# Patient Record
Sex: MALE | Race: BLACK OR AFRICAN AMERICAN | URBAN - METROPOLITAN AREA
[De-identification: names, ages, dates, MRNs, and addresses within clinical notes are randomized per-mention and may not be internally consistent; named-entity substitution may affect disease eponyms.]

---

## 2018-02-09 ENCOUNTER — HOSPITAL ENCOUNTER (OUTPATIENT)
Dept: MEDSURG UNIT | Facility: HOSPITAL | Age: 83
End: 2018-02-11
Attending: INTERNAL MEDICINE | Admitting: INTERNAL MEDICINE

## 2018-02-09 LAB
ABS NEUT (OLG): 4.13 X10(3)/MCL (ref 2.1–9.2)
ALBUMIN SERPL-MCNC: 3.7 GM/DL (ref 3.4–5)
ALBUMIN/GLOB SERPL: 1.1 RATIO (ref 1.1–2)
ALP SERPL-CCNC: 48 UNIT/L (ref 50–136)
ALT SERPL-CCNC: 23 UNIT/L (ref 12–78)
APPEARANCE, UA: CLEAR
AST SERPL-CCNC: 20 UNIT/L (ref 15–37)
BACTERIA SPEC CULT: ABNORMAL /HPF
BASOPHILS # BLD AUTO: 0 X10(3)/MCL (ref 0–0.2)
BASOPHILS NFR BLD AUTO: 0 %
BILIRUB SERPL-MCNC: 0.5 MG/DL (ref 0.2–1)
BILIRUB UR QL STRIP: NEGATIVE
BILIRUBIN DIRECT+TOT PNL SERPL-MCNC: 0.1 MG/DL (ref 0–0.5)
BILIRUBIN DIRECT+TOT PNL SERPL-MCNC: 0.4 MG/DL (ref 0–0.8)
BNP BLD-MCNC: 91 PG/ML (ref 0–100)
BUN SERPL-MCNC: 109 MG/DL (ref 7–18)
CALCIUM SERPL-MCNC: 8.8 MG/DL (ref 8.5–10.1)
CHLORIDE SERPL-SCNC: 98 MMOL/L (ref 98–107)
CO2 SERPL-SCNC: 26 MMOL/L (ref 21–32)
COLOR UR: YELLOW
CREAT SERPL-MCNC: 2.92 MG/DL (ref 0.7–1.3)
EOSINOPHIL # BLD AUTO: 0.3 X10(3)/MCL (ref 0–0.9)
EOSINOPHIL NFR BLD AUTO: 4 %
ERYTHROCYTE [DISTWIDTH] IN BLOOD BY AUTOMATED COUNT: 12.7 % (ref 11.5–17)
GLOBULIN SER-MCNC: 3.5 GM/DL (ref 2.4–3.5)
GLUCOSE (UA): NEGATIVE
GLUCOSE SERPL-MCNC: 132 MG/DL (ref 74–106)
HCT VFR BLD AUTO: 31.7 % (ref 42–52)
HGB BLD-MCNC: 10.9 GM/DL (ref 14–18)
HGB UR QL STRIP: ABNORMAL
KETONES UR QL STRIP: NEGATIVE
LEUKOCYTE ESTERASE UR QL STRIP: ABNORMAL
LYMPHOCYTES # BLD AUTO: 1.5 X10(3)/MCL (ref 0.6–4.6)
LYMPHOCYTES NFR BLD AUTO: 22 %
MCH RBC QN AUTO: 32.9 PG (ref 27–31)
MCHC RBC AUTO-ENTMCNC: 34.4 GM/DL (ref 33–36)
MCV RBC AUTO: 95.8 FL (ref 80–94)
MONOCYTES # BLD AUTO: 0.9 X10(3)/MCL (ref 0.1–1.3)
MONOCYTES NFR BLD AUTO: 14 %
NEUTROPHILS # BLD AUTO: 4.13 X10(3)/MCL (ref 2.1–9.2)
NEUTROPHILS NFR BLD AUTO: 60 %
NITRITE UR QL STRIP: NEGATIVE
PH UR STRIP: 8.5 [PH] (ref 5–9)
PLATELET # BLD AUTO: 300 X10(3)/MCL (ref 130–400)
PMV BLD AUTO: 8.7 FL (ref 9.4–12.4)
POTASSIUM SERPL-SCNC: 3.9 MMOL/L (ref 3.5–5.1)
PROT SERPL-MCNC: 7.2 GM/DL (ref 6.4–8.2)
PROT UR QL STRIP: ABNORMAL
RBC # BLD AUTO: 3.31 X10(6)/MCL (ref 4.7–6.1)
RBC #/AREA URNS HPF: ABNORMAL /[HPF]
SODIUM SERPL-SCNC: 133 MMOL/L (ref 136–145)
SP GR UR STRIP: 1.01 (ref 1–1.03)
SQUAMOUS EPITHELIAL, UA: ABNORMAL
UROBILINOGEN UR STRIP-ACNC: 0.2
WBC # SPEC AUTO: 6.8 X10(3)/MCL (ref 4.5–11.5)
WBC #/AREA URNS HPF: 5 /HPF (ref 0–3)

## 2018-02-10 LAB
ABS NEUT (OLG): 3.75 X10(3)/MCL (ref 2.1–9.2)
ALBUMIN SERPL-MCNC: 2.9 GM/DL (ref 3.4–5)
ALBUMIN/GLOB SERPL: 0.9 {RATIO}
ALP SERPL-CCNC: 44 UNIT/L (ref 50–136)
ALT SERPL-CCNC: 19 UNIT/L (ref 12–78)
AST SERPL-CCNC: 13 UNIT/L (ref 15–37)
BASOPHILS # BLD AUTO: 0 X10(3)/MCL (ref 0–0.2)
BASOPHILS NFR BLD AUTO: 0 %
BILIRUB SERPL-MCNC: 0.3 MG/DL (ref 0.2–1)
BILIRUBIN DIRECT+TOT PNL SERPL-MCNC: 0.1 MG/DL (ref 0–0.2)
BILIRUBIN DIRECT+TOT PNL SERPL-MCNC: 0.2 MG/DL (ref 0–0.8)
BUN SERPL-MCNC: 102 MG/DL (ref 7–18)
CALCIUM SERPL-MCNC: 8.5 MG/DL (ref 8.5–10.1)
CHLORIDE SERPL-SCNC: 104 MMOL/L (ref 98–107)
CO2 SERPL-SCNC: 24 MMOL/L (ref 21–32)
CREAT SERPL-MCNC: 2.54 MG/DL (ref 0.7–1.3)
EOSINOPHIL # BLD AUTO: 0.3 X10(3)/MCL (ref 0–0.9)
EOSINOPHIL NFR BLD AUTO: 4 %
ERYTHROCYTE [DISTWIDTH] IN BLOOD BY AUTOMATED COUNT: 12.6 % (ref 11.5–17)
GLOBULIN SER-MCNC: 3.1 GM/DL (ref 2.4–3.5)
GLUCOSE SERPL-MCNC: 95 MG/DL (ref 74–106)
HCT VFR BLD AUTO: 29.1 % (ref 42–52)
HGB BLD-MCNC: 9.9 GM/DL (ref 14–18)
LYMPHOCYTES # BLD AUTO: 1.7 X10(3)/MCL (ref 0.6–4.6)
LYMPHOCYTES NFR BLD AUTO: 25 %
MCH RBC QN AUTO: 32.8 PG (ref 27–31)
MCHC RBC AUTO-ENTMCNC: 34 GM/DL (ref 33–36)
MCV RBC AUTO: 96.4 FL (ref 80–94)
MONOCYTES # BLD AUTO: 1 X10(3)/MCL (ref 0.1–1.3)
MONOCYTES NFR BLD AUTO: 15 %
NEUTROPHILS # BLD AUTO: 3.75 X10(3)/MCL (ref 2.1–9.2)
NEUTROPHILS NFR BLD AUTO: 55 %
PLATELET # BLD AUTO: 260 X10(3)/MCL (ref 130–400)
PMV BLD AUTO: 8.8 FL (ref 9.4–12.4)
POTASSIUM SERPL-SCNC: 3.8 MMOL/L (ref 3.5–5.1)
PROT SERPL-MCNC: 6 GM/DL (ref 6.4–8.2)
RBC # BLD AUTO: 3.02 X10(6)/MCL (ref 4.7–6.1)
SODIUM SERPL-SCNC: 138 MMOL/L (ref 136–145)
WBC # SPEC AUTO: 6.8 X10(3)/MCL (ref 4.5–11.5)

## 2018-02-11 LAB
BUN SERPL-MCNC: 77 MG/DL (ref 7–18)
CALCIUM SERPL-MCNC: 8.3 MG/DL (ref 8.5–10.1)
CHLORIDE SERPL-SCNC: 111 MMOL/L (ref 98–107)
CO2 SERPL-SCNC: 21 MMOL/L (ref 21–32)
CREAT SERPL-MCNC: 1.68 MG/DL (ref 0.7–1.3)
GLUCOSE SERPL-MCNC: 68 MG/DL (ref 74–106)
POTASSIUM SERPL-SCNC: 3.8 MMOL/L (ref 3.5–5.1)
SODIUM SERPL-SCNC: 143 MMOL/L (ref 136–145)

## 2022-04-30 NOTE — ED PROVIDER NOTES
"   Patient:   Rick Roche             MRN: [id number]            FIN: [id number]               Age:   89 years     Sex:  Male     :  1929   Associated Diagnoses:   Chronic kidney disease (CKD); Dehydration   Author:   Maureen OVIEDO, Pauline BRISENO      Basic Information   Time seen: Date & time 2018 12:15:00.   History source: Patient, family.   Arrival mode: Private vehicle, wheelchair.   History limitation: None.   Additional information: Chief Complaint from Nursing Triage Note : Chief Complaint   2018 12:14 CST       Chief Complaint           having kidney problems,sent by Dr Aguero after lab results showed decreased kidney function, has catheter    .      History of Present Illness   The patient presents with abnormal lab test and Patient and his daughter state that patient was told to report to the ED by Dr. Aguero because his "kidneys are functioning low" (IVELISSE madden). .  The onset was unknown.  Lab test value Lab test was performed by: primary care physician Patient was notified of lab results by: doctor's office.  Associated symptoms: denies chest pain, denies abdominal pain, denies nausea, denies vomiting, denies fever, denies chills, denies shortness of breath and denies headache.  Risk factors consist of diabetes mellitus and hypertension.  Prior episodes: none.  Therapy today: none.  Additional history: none.        Review of Systems   Constitutional symptoms:  Negative except as documented in HPI.   Skin symptoms:  Negative except as documented in HPI.   Eye symptoms:  Negative except as documented in HPI.   ENMT symptoms:  Negative except as documented in HPI.   Respiratory symptoms:  Negative except as documented in HPI.   Cardiovascular symptoms:  Negative except as documented in HPI.   Gastrointestinal symptoms:  Negative except as documented in HPI.   Genitourinary symptoms:  Negative except as documented in HPI.   Musculoskeletal symptoms:  Negative except as documented in HPI. "   Neurologic symptoms:  Negative except as documented in HPI.   Psychiatric symptoms:  Negative except as documented in HPI.   Allergy/immunologic symptoms:  Negative except as documented in HPI.             Additional review of systems information: All other systems reviewed and otherwise negative, All systems reviewed as documented in chart.      Health Status   Allergies:    Allergic Reactions (Selected)  High  Morphine- No reactions were documented.  Severity Not Documented  Codeine- No reactions were documented.  Iodine- No reactions were documented.  Lortab- No reactions were documented.  Shellfish- No reactions were documented.  Sulfa drugs- No reactions were documented.,    Allergies (6) Active Reaction  morphine None Documented  codeine None Documented  iodine None Documented  Lortab None Documented  shellfish None Documented  sulfa drugs None Documented  .   Medications:  (Selected)   Prescriptions  Prescribed  albuterol 2.5 mg/3 mL (0.083%) inhalation solution: 2.5 mg = 3 mL, INH, q6hr, PRN PRN for wheezing, # 60 EA, 5 Refill(s)  ferrous sulfate 325 mg (65 mg elemental iron) oral enteric coated tablet: 325 mg = 1 tab(s), NG, BID, # 60 tab(s), 4 Refill(s)  ranolazine 500 mg oral tablet, extended release: 500 mg = 1 tab(s), Oral, BID, # 60 tab(s), 4 Refill(s)  Documented Medications  Documented  Lantus Solostar Pen 100 units/mL subcutaneous solution: = 15 units, Subcutaneous, At Bedtime, # 1 box(es), 0 Refill(s)  Nexium 40 mg oral delayed release cap ([facility] Substitution): 40 mg, Oral, Daily, # 30, 0 Refill(s)  Plavix 75 mg oral tablet: 75 mg = 1 tab(s), Oral, Daily, 0 Refill(s)  Ranexa 1000 mg oral tablet, extended release: 0 Refill(s)  SUCRALFATE 1 GM TABLET: 1 gm = 1 tab(s), Oral, QID  Uloric 40 mg oral tablet: 40 mg = 1 tab(s), Oral, Daily, 0 Refill(s)  VIT D2 1.25 MG (50,000 UNIT): Oral  Zenpep 40,000 units-136,000 units-218,000 units oral delayed release capsule: 0 Refill(s)  amlodipine 5 mg oral  tablet: 5 mg = 1 tab(s), Oral, BID, 0 Refill(s)  aspirin 325 mg oral tablet: 325 mg = 1 tab(s), Oral, Daily, 0 Refill(s)  carvedilol 25 mg oral tablet: 25 mg = 1 tab(s), Oral, BID, 0 Refill(s)  citalopram 20 mg oral tablet: 20 mg = 1 tab(s), Oral, Daily, 0 Refill(s)  desloratadine 5 mg oral tablet: 0 Refill(s)  esomeprazole 20 mg oral delayed release capsule ([facility] Substitution): 0 Refill(s)  furosemide 40 mg oral tablet: 0 Refill(s)  hydrALAZINE 10 mg oral tablet: 0 Refill(s)  hydrOXYzine hydrochloride 25 mg oral tablet: 0 Refill(s)  nitroglycerin 0.4 mg sublingual TAB: 0.4 mg = 1 tab(s), SL, q5min, PRN PRN chest pain, 0 Refill(s)  pancrelipase 10,000 units-34,000 units-55,000 units oral delayed release capsule: 1 cap(s), Oral, TIDWM, 0 Refill(s)  simvastatin 20 mg oral tablet: 20 mg = 1 tab(s), Oral, Once a day (at bedtime), # 30 tab(s), 0 Refill(s)  sucralfate 1 g/10 mL oral suspension: 1 gm = 10 mL, Oral, Daily, 0 Refill(s)  timolol hemihydrate 0.5% ophthalmic solution: 1 drop(s), OPTH, qAM, 0 Refill(s).   Immunizations: Per nurse's notes.      Past Medical/ Family/ Social History   Medical history:    Resolved  Diabetes ([id number]):  Resolved.  HTN (hypertension) ([id number]):  Resolved.  CAD (coronary artery disease) ([id number]):  Resolved.  Hyperlipidemia (50132723):  Resolved.  Arthritis (1770175):  Resolved.  Bursitis of hip ([id number]):  Resolved.  GERD (gastroesophageal reflux disease) ([id number]):  Resolved.  Urinary frequency ([id number]):  Resolved., Reviewed as documented in chart.   Surgical history:    cardiac stents.  Comments:  8/15/2014 17:34 - Cece SMITH ,   x 6  cataract removal., Reviewed as documented in chart.   Family history:    No family history items have been selected or recorded., Reviewed as documented in chart.   Social history: Alcohol use: Denies, Tobacco use: Denies,  Drug use: Denies, Family/social situation: .      Physical Examination               Vital Signs   Vital Signs   2/9/2018 12:14 CST       Temperature Oral          36.4 DegC                             Temperature Oral (calculated)             97.52 DegF                             Peripheral Pulse Rate     62 bpm                             Respiratory Rate          20 br/min                             SpO2                      98 %                             Oxygen Therapy            Room air                             Systolic Blood Pressure   135 mmHg                             Diastolic Blood Pressure  61 mmHg  .   General:  Alert, no acute distress.    Skin:  Warm, dry, pink, intact.    Head:  Normocephalic, atraumatic.    Neck:  Supple, trachea midline, no tenderness.    Eye:  Pupils are equal, round and reactive to light, extraocular movements are intact, normal conjunctiva, vision unchanged.    Ears, nose, mouth and throat:  Oral mucosa moist.   Cardiovascular:  Regular rate and rhythm, No murmur, Normal peripheral perfusion, No edema.    Respiratory:  Lungs are clear to auscultation, respirations are non-labored, breath sounds are equal, Symmetrical chest wall expansion.    Back:  Normal range of motion.   Musculoskeletal:  Normal ROM.   Chest wall   Gastrointestinal:  Soft, Nontender, Non distended.    Genitourinary   Neurological:  Alert and oriented to person, place, time, and situation, No focal neurological deficit observed, CN II-XII intact, normal sensory observed, normal motor observed, normal speech observed, normal coordination observed.    Lymphatics:  No lymphadenopathy.   Psychiatric:  Cooperative, appropriate mood & affect, normal judgment.       Medical Decision Making   Differential Diagnosis:  Renal insufficiency, renal failure, lab error.    Differential Diagnosis:  Dehydration, electrolyte imbalance.    Documents reviewed:  Emergency department nurses' notes, emergency  department records, prior records.    Results review:  Lab results : Lab View   2/9/2018 14:11 CST       POC BNP iSTAT             91 pg/mL    2/9/2018 13:29 CST       UA Appear                 CLEAR                             UA Color                  YELLOW                             UA Spec Grav              1.007                             UA Bili                   Negative                             UA pH                     8.5                             UA Urobilinogen           0.2                             UA Blood                  Trace                             UA Glucose                Negative                             UA Ketones                Negative                             UA Protein                Trace                             UA Nitrite                Negative                             UA Leuk Est               3+                             UA WBC                    5 /HPF  HI                             UA RBC                    NONE SEEN                             UA Bacteria               NONE SEEN /HPF                             UA Squam Epithelial       NONE SEEN    2/9/2018 12:34 CST       Sodium Lvl                133 mmol/L  LOW                             Potassium Lvl             3.9 mmol/L                             Chloride                  98 mmol/L                             CO2                       26.0 mmol/L                             Calcium Lvl               8.8 mg/dL                             Glucose Lvl               132 mg/dL  HI                             BUN                       109.0 mg/dL  HI                             Creatinine                2.92 mg/dL  HI                             eGFR-AA                   26 mL/min/1.73 m2  NA                             eGFR-HUNTER                  22 mL/min/1.73 m2  NA                             Bili Total                0.5 mg/dL                             Bili Direct               0.10 mg/dL                              Bili Indirect             0.40 mg/dL                             AST                       20 unit/L                             ALT                       23 unit/L                             Alk Phos                  48 unit/L  LOW                             Total Protein             7.2 gm/dL                             Albumin Lvl               3.70 gm/dL                             Globulin                  3.50 gm/dL                             A/G Ratio                 1.1 ratio                             WBC                       6.8 x10(3)/mcL                             RBC                       3.31 x10(6)/mcL  LOW                             Hgb                       10.9 gm/dL  LOW                             Hct                       31.7 %  LOW                             Platelet                  300 x10(3)/mcL                             MCV                       95.8 fL  HI                             MCH                       32.9 pg  HI                             MCHC                      34.4 gm/dL                             RDW                       12.7 %                             MPV                       8.7 fL  LOW                             Abs Neut                  4.13 x10(3)/mcL                             Neutro Auto               60 %  NA                             Lymph Auto                22 %                             Mono Auto                 14 %  NA                             Eos Auto                  4 %  NA                             Abs Eos                   0.3 x10(3)/mcL                             Basophil Auto             0 %  NA                             Abs Neutro                4.13 x10(3)/mcL                             Abs Lymph                 1.5 x10(3)/mcL                             Abs Mono                  0.9 x10(3)/mcL                             Abs Baso                  0.0 x10(3)/mcL    .      Reexamination/  Reevaluation   Assessment: Discussed patient with dr. Malone. she will see and examine patient. .      Impression and Plan   Diagnosis   Chronic kidney disease (CKD) (LVA52-JV N18.9)   Dehydration (GCB02-HC E86.0)      Calls-Consults   -  2/9/2018 14:20:00 , phone call, recommends Spoke with Salvador on call for Dr. Aguero. recommends to admit patient to hospitalist and consult renal. states to give NS at 75/hr, repeat labs in AM, hold Lasix, and to test stool for occult blood..    -  2/9/2018 15:00:00 , Melanie Kwnog, phone call, recommends MAY ADMIT PATIENT FOR OUTPATIENT OBSERVATION, REPEAT LABS, IV FLUIDS, AND DO NOT CONSULT ANYONE ELSE..    Plan   Condition: Stable.    Disposition: Admit to Inpatient Unit.       Addendum      Teaching-Supervisory Addendum-Brief   I participated in the following activities of this patients care: the medical history, the physical exam, medical decision making.   I personally performed: supervision of the patient's care, the medical history, the physical exam, the medical decision making.   The case was discussed with: the nurse practitioner.   Evaluation and management service: I agree with the evaluation and management decisions made in this patient's care.   Results interpretation: I agree with the study interpretation in this patient's care.   Notes: I conducted my own face-to-face evaluation of the patient and performed an independent history and physical examination of this patient. I discussed the MDM and reviewed the results with the NP.     Briefly, this is an 89-year-old male with a history of chronic kidney disease who was referred to the emergency department by his nephrologist for outpatient labs demonstrating worsening renal function with significant prerenal component likely due to recent increase in his diuretic use.  Patient denies any specific complaints at this time.  Review labs demonstrates a worsening anemia and his baseline as well as significant  prerenal acute kidney injury with a markedly elevated BUNs.  The NP discussed with nephrology on call who recommends admission to the hospitalist service, IV fluids, hold Lasix, trend CBC and chemistry while inpatient. Findings and plan discussed with the patient, and he is agreeable to admission at this time.     Ashlyn Malone MD.

## 2022-04-30 NOTE — H&P
"   Patient:   Rick Roche             MRN: [id number]            FIN: [id number]               Age:   89 years     Sex:  Male     :  1929   Associated Diagnoses:   None   Author:   Alistair Melendrez MD      DATE OF ADMIT: 2018 20:18  REFERRAL SOURCE: ED - Ashlyn Malone MD  PCP: Mookie Heck MD    CHIEF COMPLAINT: abnormal labs    HISTORY OF PRESENTING ILLNESS   89 year old AAM with history of CKD III presents with worsening renal function on outpatient labs.  Patient was apparently had some routine blood work done at [facility] cardiologist today.  It was noted his creatinine has significantly worsened.  His nephrologist was notified and recommended presenting to ED.  Per the daughter, his cardiologist had increased his lasix dose from 40 to 60 daily for the last week, in attempt to remove some more fluid.  He reports he does note significantly less swelling and daughter notes he has been sleeping better at night.  However as noted above, today creatinine was noted to be significantly elevated following this therapy.  Daughter also notes however that the patient's urine is very concentrated in AM.  At present patient denies any symptoms, tells me "He feels good."    REVIEW OF SYSTEMS  All other systems reviewed and negative, except as documented above.    PAST MEDICAL HISTORY  Diabetes  HTN  CAD  Hyperlipidemia  Arthritis  Bursitis of hip  GERD  Urinary frequency    PAST SURGICAL HISTORY  Cardiac stents x6  Cataract extraction  Left nephrectomy    FAMILY HISTORY  Reviewed, negative in relation to patient's current condition.    SOCIAL HISTORY  Denies any history of alcohol, tobacco, or drug abuse.  Lives with family, whom he is dependant on for assistance with ADLs    ALLERGIES  MORPHINE, CODEINE, HYDROCODONE  IODINE, SHELLFISH  SULFA DRUGS    HOME MEDICATIONS  albuterol 2.5 mg/3 mL (0.083%) inhalation solution 2.5 mg = 3 mL, PRN, INH, q6hr  amlodipine 5 mg oral tablet 5 mg = 1 tab(s), Oral, BID  aspirin " 325 mg oral tablet 325 mg = 1 tab(s), Oral, Daily  AZOPT 1% EYE DROPS , OPTH, BID  carvedilol 25 mg oral tablet 25 mg = 1 tab(s), Oral, BID  citalopram 20 mg oral tablet 20 mg = 1 tab(s), Oral, Daily  desloratadine 5 mg oral tablet   esomeprazole 20 mg oral delayed release capsule ([facility] Substitution)   ferrous sulfate 325 mg (65 mg elemental iron) oral enteric coated tablet 325 mg = 1 tab(s), NG, BID  furosemide 40 mg oral tablet   hydrALAZINE 10 mg oral tablet   hydrOXYzine hydrochloride 25 mg oral tablet   Lantus Solostar Pen 100 units/mL subcutaneous solution 15 units, Subcutaneous, At Bedtime  LORAZEPAM 0.5 MG TABLET 0.5 mg = 1 tab(s), Oral, q8hr  METOLAZONE 2.5 MG TABLET 2.5 mg = 1 tab(s), Oral, Daily  Nexium 40 mg oral delayed release cap ([facility] Substitution) 40 mg, Oral, Daily  nitroglycerin 0.4 mg sublingual TAB 0.4 mg = 1 tab(s), PRN, SL, q5min  pancrelipase 10,000 units-34,000 units-55,000 units oral delayed release capsule 1 cap(s), Oral, TIDWM  Plavix 75 mg oral tablet 75 mg = 1 tab(s), Oral, Daily  Ranexa 1000 mg oral tablet, extended release   ranolazine 500 mg oral tablet, extended release 500 mg = 1 tab(s), Oral, BID  simvastatin 20 mg oral tablet 20 mg = 1 tab(s), Oral, Once a day (at bedtime)  sucralfate 1 g/10 mL oral suspension 1 gm = 10 mL, Oral, Daily  SUCRALFATE 1 GM TABLET 1 gm = 1 tab(s), Oral, QID  timolol hemihydrate 0.5% ophthalmic solution 1 drop(s), OPTH, qAM  TRAZODONE 100 MG TABLET 100 mg = 1 tab(s), Oral, qPM  Uloric 40 mg oral tablet 40 mg = 1 tab(s), Oral, Daily  VIT D2 1.25 MG (50,000 UNIT) , Oral  Zenpep 40,000 units-136,000 units-218,000 units oral delayed release capsule     PHYSICAL EXAM  VITAL SIGNS  Temperature  36.4  (20:02)  Systolic Blood Pressure 128  (20:02)  Diastolic Blood Pressure 70  (20:02)  Pulse   72  (20:02)  SpO2   98  (20:02)  Respiratory Rate  18  (20:02)  GENERAL: awake and in no acute distress  HEENT: NC/AT, EOMI, PERRL. Conjuctiva are pink and sclera are  white.  OP clear of erythema or exudate  NECK: Supple, no LAD  LUNGS: Bibaslilar rales, otherwise no wheezning or rhonchi, no peripheral cyanosis  CVS: RRR, S1S2 positive  ABD: Soft, non-tender, non-distended, bowel sounds present  EXTREMITIES: peripheral pulses 2+, 2+ peripheral edema  SKIN: Warm, dry. No rashes or lesions  NEURO: Alert and oriented, no focal neurological deficit  PSYCHIATRIC: Cooperative, appropriate mood and affect.    LABS  Labs Last 24 Hours   Chemistry Hematology/Coagulation   Sodium Lvl: 133 mmol/L Low (02/09/18 13:59:08) WBC: 6.8 x10(3)/mcL (02/09/18 13:08:20)   Potassium Lvl: 3.9 mmol/L (02/09/18 13:59:08) RBC: 3.31 x10(6)/mcL Low (02/09/18 13:08:20)   Chloride: 98 mmol/L (02/09/18 13:59:08) Hgb: 10.9 gm/dL Low (02/09/18 13:08:20)   CO2: 26 mmol/L (02/09/18 13:59:08) Hct: 31.7 % Low (02/09/18 13:08:20)   Calcium Lvl: 8.8 mg/dL (02/09/18 13:59:08) Platelet: 300 x10(3)/mcL (02/09/18 13:08:20)   Glucose Lvl: 132 mg/dL High (02/09/18 13:59:08) MCV: 95.8 fL High (02/09/18 13:08:20)   BUN: 109 mg/dL High (02/09/18 13:59:08) MCH: 32.9 pg High (02/09/18 13:08:20)   Creatinine: 2.92 mg/dL High (02/09/18 13:59:08) MCHC: 34.4 gm/dL (02/09/18 13:08:20)   eGFR-AA: 26 mL/min/1.73 m2 (02/09/18 13:59:09) RDW: 12.7 % (02/09/18 13:08:20)   eGFR-HUNTER: 22 mL/min/1.73 m2 (02/09/18 13:59:10) MPV: 8.7 fL Low (02/09/18 13:08:20)   Bili Total: 0.5 mg/dL (02/09/18 14:01:14) Abs Neut: 4.13 x10(3)/mcL (02/09/18 13:08:20)   Bili Direct: 0.1 mg/dL (02/09/18 13:59:08) Neutro Auto: 60 % (02/09/18 13:08:21)   Bili Indirect: 0.4 mg/dL (02/09/18 14:01:14) Lymph Auto: 22 % (02/09/18 13:08:21)   AST: 20 unit/L (02/09/18 13:59:08) Mono Auto: 14 % (02/09/18 13:08:21)   ALT: 23 unit/L (02/09/18 13:59:08) Eos Auto: 4 % (02/09/18 13:08:21)   Alk Phos: 48 unit/L Low (02/09/18 14:01:14) Abs Eos: 0.3 x10(3)/mcL (02/09/18 13:08:21)   Total Protein: 7.2 gm/dL (02/09/18 14:01:14) Basophil Auto: 0 % (02/09/18 13:08:21)   Albumin Lvl: 3.7  gm/dL (02/09/18 13:59:08) Abs Neutro: 4.13 x10(3)/mcL (02/09/18 13:08:21)   Globulin: 3.5 gm/dL (02/09/18 14:01:14) Abs Lymph: 1.5 x10(3)/mcL (02/09/18 13:08:21)   A/G Ratio: 1.1 ratio (02/09/18 14:01:14) Abs Mono: 0.9 x10(3)/mcL (02/09/18 13:08:21)   POC BNP iSTAT: 91 pg/mL (02/09/18 14:25:16) Abs Baso: 0 x10(3)/mcL (02/09/18 13:08:21)     ASSESSMENT  HAKAN on CKD III  IVVD  Anemia of chronic disease    PLAN  Admit to observation.  Hold Lasix and metolazone, Gentle IV hydration overnight  Repeat BMP in AM  If renal function improves can be discharged home  Would recommend scaling back Lasix to 40 mg daily    ADVANCED DIRECTIVES: None  VTE PROPHYLAXIS:  B/L SCDs

## 2022-04-30 NOTE — DISCHARGE SUMMARY
"   Patient:   Rick Roche             MRN: [id number]            FIN: [id number]               Age:   89 years     Sex:  Male     :  1929   Associated Diagnoses:   None   Author:   Alistair Melendrez MD      Admit Date: 2018  Discharge Date: 2018    PHYSICIANS  Attending Physician - Aneesh SELLERS, Alistair  Consulting Physician - Lam SELLERS, Peyman  Consulting Physician - Aneesh SELLERS, Alistair  Primary Care Physician - Mookie Heck MD    DISCHARGE DIAGNOSIS  Acute kidney failure, unspecified (N17.9)   Hypovolemia (E86.1)   Dehydration (E86.0)   Chronic kidney disease, unspecified (N18.9)     PROCEDURES  No procedures recorded for this visit.    HOSPITAL COURSE  89 year old AAM with history of CKD III presents with worsening renal function on outpatient labs.  Patient was apparently had some routine blood work done at [facility] cardiologist today.  It was noted his creatinine has significantly worsened.  His nephrologist was notified and recommended presenting to ED.  Per the daughter, his cardiologist had increased his lasix dose from 40 to 60 daily and added metolazone for the last one week, in attempt to remove some more fluid.  He reports he does note significantly less swelling and daughter notes he has been sleeping better at night.  However as noted above, today creatinine was noted to be significantly elevated following this therapy.  Daughter also notes however that the patient's urine is very concentrated in AM.  At the time of admit patient denies any symptoms, tells me "He feels good."  Renal function on admit; , creatinine 2.9.  He was admitted to hospitalist service and received gentle IV hydration while monitoring his fluid status.  HD #1 following admission creatinine come down 2.4.  He was continued on gentle IV hydration  once more overnight, and on HD #2 creatinine was down to 1.6, BUN of 77.  Throughout his hospital stay patient remained respiratory wise stable, and saturating " well on room air.  Patient will be discharged home to continue her Lasix 40 mg daily, his with metolazone to be only on Monday Wednesday Friday.  Will arrange short-term follow-up with both his PCP and his cardiologist to monitor his renal function and fluid status.    STATUS  Improved    DISPOSITION  Discharge to home with previosu HH    DIET  Cardiac    ACTIVITY  As tolerated    IMMUNIZATIONS  No immunizations recorded for this visit.    FOLLOW-UP  Umang SELLERS, Mookie Livingston II, MD , Victor Manuel BRISENO      DISCHARGE MEDICATION RECONCILIATION  Prescribed  furosemide (furosemide 40 mg oral tablet) 40 mg, Oral, Daily  metolazone (metolazone 2.5 mg oral tablet) 2.5 mg, Oral, M,W,F  Continue  albuterol (albuterol 2.5 mg/3 mL (0.083%) inhalation solution) 2.5 mg, INH, q6hr, PRN for wheezing  amlodipine (amlodipine 5 mg oral tablet) 5 mg, Oral, BID  aspirin (aspirin 325 mg oral tablet) 325 mg, Oral, Daily  brinzolamide (AZOPT 1% EYE DROPS), OPTH, BID  carvedilol (carvedilol 25 mg oral tablet) 25 mg, Oral, BID  citalopram (citalopram 20 mg oral tablet) 20 mg, Oral, Daily  clopidogrel (Plavix 75 mg oral tablet) 75 mg, Oral, Daily  desloratadine (desloratadine 5 mg oral tablet)  ergocalciferol (VIT D2 1.25 MG (50,000 UNIT)), Oral  febuxostat (Uloric 40 mg oral tablet) 40 mg, Oral, Daily  ferrous sulfate (ferrous sulfate 325 mg (65 mg elemental iron) oral enteric coated tablet) 325 mg, NG, BID  hydralazine (hydrALAZINE 10 mg oral tablet)  hydroxyzine (hydrOXYzine hydrochloride 25 mg oral tablet)  insulin glargine (Lantus Solostar Pen 100 units/mL subcutaneous solution) 15 units, Subcutaneous, At Bedtime  lorazepam (LORAZEPAM 0.5 MG TABLET) 0.5 mg, Oral, q8hr  nitroglycerin (nitroglycerin 0.4 mg sublingual TAB) 0.4 mg, SL, q5min, PRN chest pain  pancrelipase (Zenpep 40,000 units-136,000 units-218,000 units oral delayed release capsule)  pancrelipase (pancrelipase 10,000 units-34,000 units-55,000 units oral delayed release capsule) 1  cap(s), Oral, TIDWM  pantoprazole (Nexium 40 mg oral delayed release cap ([facility] Substitution)) 40 mg, Oral, Daily  pantoprazole (esomeprazole 20 mg oral delayed release capsule ([facility] Substitution))  ranolazine (Ranexa 1000 mg oral tablet, extended release)  ranolazine (ranolazine 500 mg oral tablet, extended release) 500 mg, Oral, BID  simvastatin (simvastatin 20 mg oral tablet) 20 mg, Oral, Once a day (at bedtime)  sucralfate (SUCRALFATE 1 GM TABLET) 1 gm, Oral, QID  sucralfate (sucralfate 1 g/10 mL oral suspension) 1 gm, Oral, Daily  timolol ophthalmic (timolol hemihydrate 0.5% ophthalmic solution) 1 drop(s), OPTH, qAM  trazodone (TRAZODONE 100 MG TABLET) 100 mg, Oral, qPM      PHYSICAL EXAM  Temperature  36.8  (07:54)  Systolic Blood Pressure 151  (07:54)  Diastolic Blood Pressure 62  (07:54)  Pulse   63  (07:54)  SpO2   98  (07:54)  Respiratory Rate  20  (07:54)    GENERAL: awake and in no acute distress  LUNGS: Rales in bilateral bases, no peripheral cyanosis  CVS: RRR, S1S2 positive  ABD: Soft, non-tender, non-distended, bowel sounds present  EXTREMITIES: peripheral pulses 2+, 2-3+ peripheral edema  NEURO: AAOx3  PSYCHIATRIC: Cooperative      Time spent on discharge 35 minutes